# Patient Record
Sex: FEMALE | URBAN - METROPOLITAN AREA
[De-identification: names, ages, dates, MRNs, and addresses within clinical notes are randomized per-mention and may not be internally consistent; named-entity substitution may affect disease eponyms.]

---

## 2017-03-06 NOTE — PATIENT DISCUSSION
The patient notes gradual worsening of visual acuity. This is likely due to progressive atrophy and unfortunately is not treatable.

## 2017-04-12 NOTE — PROCEDURE NOTE: CLINICAL
PROCEDURE NOTE: Eylea (prophy) OD. Diagnosis: Neovascular AMD with Inactive CNV. Prior to injection, risks/benefits/alternatives discussed including corneal abrasion, infection, loss of vision, hemorrhage, cataract, glaucoma, retinal tears or detachment. A written consent is on file, and the need for today’s injection was discussed and the patient is understanding and wishes to proceed. The entire vial of Eylea was drawn up into a syringe. The opened vial, remaining drug, and filtered needle were disposed of in a certified biohazard container. Betadine prep was performed. Topical anesthesia was induced with Alcaine. 4% lidocaine pledge. A lid speculum was used. A short 30g needle on a 1cc syringe was used with product that that had previously been prepared under sterile conditions. Injection site: 3-4 mm from the limbus. The used syringe/needle was transferred to a biohazard container. Lid speculum removed. Mask worn during procedure. Patient tolerated procedure well. Count fingers vision was verified. There were no complications. Patient was given the standard instruction sheet. Patient given office phone number/answering service number and advised to call immediately should there be loss of vision or pain, or should they have any other questions or concerns. Alisa Bronson

## 2017-04-12 NOTE — PATIENT DISCUSSION
Recommended Eylea #9 (prophy) injection. The injection was given and tolerated well by patient. Post-injection instructions were reviewed and understood by the patient.

## 2017-05-24 NOTE — PROCEDURE NOTE: CLINICAL
PROCEDURE NOTE: Eylea (Prophy) OD. Diagnosis: Neovascular AMD with Inactive CNV. Prep: Betadine Flush. Prior to injection, risks/benefits/alternatives discussed including corneal abrasion, infection, loss of vision, hemorrhage, cataract, glaucoma, retinal tears or detachment. A written consent is on file, and the need for today’s injection was discussed and the patient is understanding and wishes to proceed. The entire vial of Eylea was drawn up into a syringe. The opened vial, remaining drug, and filtered needle were disposed of in a certified biohazard container. Betadine prep was performed. Topical anesthesia was induced with Alcaine. 4% lidocaine pledge. A lid speculum was used. A short 30g needle on a 1cc syringe was used with product that that had previously been prepared under sterile conditions. Injection site: 3-4 mm from the limbus. The used syringe/needle was transferred to a biohazard container. Lid speculum removed. Mask worn during procedure. Patient tolerated procedure well. Count fingers vision was verified. There were no complications. Patient was given the standard instruction sheet. Patient given office phone number/answering service number and advised to call immediately should there be loss of vision or pain, or should they have any other questions or concerns. Jj Tejada

## 2017-12-01 NOTE — PROCEDURE NOTE: CLINICAL
PROCEDURE NOTE: Eylea #12 OD. Diagnosis: Neovascular AMD with Inactive CNV. Prep: Betadine Drops and Betadine Scrub. Prior to injection, risks/benefits/alternatives discussed including corneal abrasion, infection, loss of vision, hemorrhage, cataract, glaucoma, retinal tears or detachment. A written consent is on file, and the need for today’s injection was discussed and the patient is understanding and wishes to proceed. The entire vial of Eylea was drawn up into a syringe. The opened vial, remaining drug, and filtered needle were disposed of in a certified biohazard container. Betadine prep was performed. Topical anesthesia was induced with Alcaine. 4% lidocaine pledge. A lid speculum was used. A short 30g needle on a 1cc syringe was used with product that that had previously been prepared under sterile conditions. Injection site: 3-4 mm from the limbus. The used syringe/needle was transferred to a biohazard container. Lid speculum removed. Mask worn during procedure. Patient tolerated procedure well. Count fingers vision was verified. There were no complications. Patient was given the standard instruction sheet. Patient given office phone number/answering service number and advised to call immediately should there be loss of vision or pain, or should they have any other questions or concerns. Lupe Lee

## 2017-12-01 NOTE — PATIENT DISCUSSION
Injection administered without complication.  Post-injection instructions reviewed and understood by patient.

## 2018-02-08 NOTE — PATIENT DISCUSSION
The patient notes gradual worsening of visual acuity. This is likely due to progressive atrophy and unfortunately is not treatable.  No increase in vision potential due to damage from Advanced AMD.

## 2018-02-08 NOTE — PATIENT DISCUSSION
No fluid seen on OCT or exam today.  Still inactive, recommend treatment for maintenance. Rec Maximo # 13 in 3 weeks. Advised to call with any vision changes.

## 2018-02-28 NOTE — PROCEDURE NOTE: CLINICAL
PROCEDURE NOTE: Eylea #13 OD. Diagnosis: Neovascular AMD with Inactive CNV. Prep: Betadine Drops and Betadine Scrub. Prior to injection, risks/benefits/alternatives discussed including corneal abrasion, infection, loss of vision, hemorrhage, cataract, glaucoma, retinal tears or detachment. A written consent is on file, and the need for today’s injection was discussed and the patient is understanding and wishes to proceed. The entire vial of Eylea was drawn up into a syringe. The opened vial, remaining drug, and filtered needle were disposed of in a certified biohazard container. Betadine prep was performed. Topical anesthesia was induced with Alcaine. 4% lidocaine pledge. A lid speculum was used. A short 30g needle on a 1cc syringe was used with product that that had previously been prepared under sterile conditions. Injection site: 3-4 mm from the limbus. The used syringe/needle was transferred to a biohazard container. Lid speculum removed. Mask worn during procedure. Patient tolerated procedure well. Count fingers vision was verified. There were no complications. Patient was given the standard instruction sheet. Patient given office phone number/answering service number and advised to call immediately should there be loss of vision or pain, or should they have any other questions or concerns. Vandana Huggins

## 2018-04-19 NOTE — PROCEDURE NOTE: CLINICAL
PROCEDURE NOTE: Eylea #14 OD. Diagnosis: Neovascular AMD with Inactive CNV. Prep: Betadine Drops and Betadine Scrub. Prior to injection, risks/benefits/alternatives discussed including corneal abrasion, infection, loss of vision, hemorrhage, cataract, glaucoma, retinal tears or detachment. A written consent is on file, and the need for today’s injection was discussed and the patient is understanding and wishes to proceed. The entire vial of Eylea was drawn up into a syringe. The opened vial, remaining drug, and filtered needle were disposed of in a certified biohazard container. Betadine prep was performed. Topical anesthesia was induced with Alcaine. 4% lidocaine pledge. A lid speculum was used. A short 30g needle on a 1cc syringe was used with product that that had previously been prepared under sterile conditions. Injection site: 3-4 mm from the limbus. The used syringe/needle was transferred to a biohazard container. Lid speculum removed. Mask worn during procedure. Patient tolerated procedure well. Count fingers vision was verified. There were no complications. Patient was given the standard instruction sheet. Patient given office phone number/answering service number and advised to call immediately should there be loss of vision or pain, or should they have any other questions or concerns. Radha Sawyer

## 2018-04-19 NOTE — PATIENT DISCUSSION
Brandon Marsh # 14 today. The injection was given and tolerated well by patient. Post-injection instructions were reviewed and understood by the patient.

## 2018-05-31 NOTE — PATIENT DISCUSSION
Still inactive today.  Recommend Eylea #15 in 3 weeks, for maintenance, just before the Pt leaves for [place].

## 2018-06-20 NOTE — PATIENT DISCUSSION
Pt leaving to go [place] for possibly more than 8 weeks. Advised pt that if she is gone longer than 8 weeks she needs to be seen by a Retina Specialist near her. Records given to patient.

## 2018-06-20 NOTE — PATIENT DISCUSSION
Recommended injection of Eylea # 15. The injection was given and tolerated well by patient. Post-injection instructions were reviewed and understood by the patient.

## 2018-06-20 NOTE — PROCEDURE NOTE: CLINICAL
PROCEDURE NOTE: Eylea #15 OD. Diagnosis: Neovascular AMD with Inactive CNV. Prior to injection, risks/benefits/alternatives discussed including corneal abrasion, infection, loss of vision, hemorrhage, cataract, glaucoma, retinal tears or detachment. A written consent is on file, and the need for today’s injection was discussed and the patient is understanding and wishes to proceed. The entire vial of Eylea was drawn up into a syringe. The opened vial, remaining drug, and filtered needle were disposed of in a certified biohazard container. Betadine prep was performed. Topical anesthesia was induced with Alcaine. 4% lidocaine pledge. A lid speculum was used. A short 30g needle on a 1cc syringe was used with product that that had previously been prepared under sterile conditions. Injection site: 3-4 mm from the limbus. The used syringe/needle was transferred to a biohazard container. Lid speculum removed. Mask worn during procedure. Patient tolerated procedure well. Count fingers vision was verified. There were no complications. Patient was given the standard instruction sheet. Patient given office phone number/answering service number and advised to call immediately should there be loss of vision or pain, or should they have any other questions or concerns. Mahogany Navarro

## 2018-11-08 NOTE — PATIENT DISCUSSION
Rec Eylea in 3-4 weeks .  No new fluid or blood seen.  Pt will need to take the DMV form to an O.D. to fill out.

## 2018-11-28 NOTE — PROCEDURE NOTE: CLINICAL
PROCEDURE NOTE: Eylea 2mg #18 OD. Diagnosis: Neovascular AMD with Inactive CNV. Prior to injection, risks/benefits/alternatives discussed including corneal abrasion, infection, loss of vision, hemorrhage, cataract, glaucoma, retinal tears or detachment. A written consent is on file, and the need for today’s injection was discussed and the patient is understanding and wishes to proceed. The entire vial of Eylea was drawn up into a syringe. The opened vial, remaining drug, and filtered needle were disposed of in a certified biohazard container. Betadine prep was performed. Topical anesthesia was induced with Alcaine. 4% lidocaine pledge. A lid speculum was used. A short 30g needle on a 1cc syringe was used with product that that had previously been prepared under sterile conditions. Injection site: 3-4 mm from the limbus. The used syringe/needle was transferred to a biohazard container. Lid speculum removed. Mask worn during procedure. Patient tolerated procedure well. Count fingers vision was verified. There were no complications. Patient was given the standard instruction sheet. Patient given office phone number/answering service number and advised to call immediately should there be loss of vision or pain, or should they have any other questions or concerns. Lupe Lee

## 2019-02-06 NOTE — PROCEDURE NOTE: CLINICAL
PROCEDURE NOTE: Eylea 2mg #19 OD. Diagnosis: Neovascular AMD with Inactive CNV. Prior to injection, risks/benefits/alternatives discussed including corneal abrasion, infection, loss of vision, hemorrhage, cataract, glaucoma, retinal tears or detachment. A written consent is on file, and the need for today’s injection was discussed and the patient is understanding and wishes to proceed. The entire vial of Eylea was drawn up into a syringe. The opened vial, remaining drug, and filtered needle were disposed of in a certified biohazard container. Betadine prep was performed. Topical anesthesia was induced with Alcaine. 4% lidocaine pledge. A lid speculum was used. A short 30g needle on a 1cc syringe was used with product that that had previously been prepared under sterile conditions. Injection site: 3-4 mm from the limbus. The used syringe/needle was transferred to a biohazard container. Lid speculum removed. Mask worn during procedure. Patient tolerated procedure well. Count fingers vision was verified. There were no complications. Patient was given the standard instruction sheet. Patient given office phone number/answering service number and advised to call immediately should there be loss of vision or pain, or should they have any other questions or concerns. A#[id number] exp 07/2019.

## 2019-03-28 NOTE — PATIENT DISCUSSION
Pt edu remaining stable. Due to high tendency to return and only [facility] would recommend Eylea # 20 in 3 weeks. Goal is to treat and extend. Pt elects to proceed. [place] leaving [place] until about June. Pt edu there is always dry AMD under Wet AMD and atrophy could be contributing to decreased VA and since only [facility] will notice subtle changes in [place].

## 2019-04-17 NOTE — PROCEDURE NOTE: CLINICAL
PROCEDURE NOTE: Eylea 2mg #20 OD. Diagnosis: Neovascular AMD with Inactive CNV. Prior to injection, risks/benefits/alternatives discussed including corneal abrasion, infection, loss of vision, hemorrhage, cataract, glaucoma, retinal tears or detachment. A written consent is on file, and the need for today’s injection was discussed and the patient is understanding and wishes to proceed. The entire vial of Eylea was drawn up into a syringe. The opened vial, remaining drug, and filtered needle were disposed of in a certified biohazard container. Betadine prep was performed. Topical anesthesia was induced with Alcaine. 4% lidocaine pledge. A lid speculum was used. A short 30g needle on a 1cc syringe was used with product that that had previously been prepared under sterile conditions. Injection site: 3-4 mm from the limbus. The used syringe/needle was transferred to a biohazard container. Lid speculum removed. Mask worn during procedure. Patient tolerated procedure well. Count fingers vision was verified. There were no complications. Patient was given the standard instruction sheet. Patient given office phone number/answering service number and advised to call immediately should there be loss of vision or pain, or should they have any other questions or concerns. [facility]#[id number] exp 10/2019.

## 2019-04-23 NOTE — PROCEDURE NOTE: CLINICAL
PROCEDURE NOTE: Excision of Eyelid Lesion Left Upper Lid. Diagnosis: Eyelid Lesion, Uncertain Behavior. Anesthesia: Topical. Prior to treatment, the risks/benefits/alternatives were discussed. The patient wished to proceed with procedure. Local anesthetic was given. The eyelid was prepped and draped for procedure. The lesion was excised and cauterized and Sagar lian was applied. Patient tolerated procedure well. There were no complications. Post procedure instructions given. Radha Sawyer

## 2019-04-23 NOTE — PATIENT DISCUSSION
Discussed with patient lesion appears benign can be removed.  May grow back.  Patient desires removal today.   Lesion removed today and discarded.  Follow up with FARRUKH elena.

## 2019-06-13 NOTE — PATIENT DISCUSSION
No fluid seen on OCT or exam today.  Recommended Eylea #21 in 2-3 weeks for maintenance.  Pt states she will be in [place] until mid Sept.  Pt will rodri appt with Dr. Blake Corrigan for 2-3 weeks.

## 2019-06-13 NOTE — PATIENT DISCUSSION
No fluid seen on OCT or exam today.  Recommended Eylea #21 in 2-3 weeks for maintenance.  Pt states she will be in [place], pt will rodri appt with Dr. Tate Guthrie Will fax records to Dr. Willy Garland.

## 2019-10-17 NOTE — PATIENT DISCUSSION
Pt edu stable, no fluid seen on exam or OCT today. Treatment not indicated at this time. Will only treat if worsens. Advised pt to call with any vision changes. Will follow up in 2 months.

## 2019-12-19 NOTE — PATIENT DISCUSSION
Chronic edema present but no tx rec at this time due to advanced damage/discaform scar from Wet AMD.

## 2020-02-27 NOTE — PATIENT DISCUSSION
Pt edu stable, no fluid seen on exam or OCT today. Treatment not indicated at this time. Will only treat if worsens. Advised pt to call with any vision changes. Will follow up in 3 months. Advised pt can only treat wet not dry.

## 2020-04-08 NOTE — PATIENT DISCUSSION
Patient elects observation. Spouse Niraj states patient has been experiencing symptoms of a TIA for over an hour.  Weakness, trouble forming words, slurring. Spouse says she has had this once before  A bad one at that time patient was seen in at [facility] ER.    This nurse advised spouse due to continued symptoms of possible TIA/CVA patient needs to be seen in the ER for evaluation right away,  Spouse states he will take patient to [facility]  As this is where she went in the past.  Niraj instructed  to call 911 if patient unable to get into the car.    Spouse  can be reached at [phone]    Forward to Dr Candace ESTES  Forward to YONI RN [facility] ER follow up

## 2020-06-03 NOTE — PATIENT DISCUSSION
Pt edu stable, no fluid seen on exam or OCT today. Treatment not indicated at this time. Will only treat if worsens. Advised pt to call with any vision changes. Will follow up in 4 months. Advised pt can only treat wet not dry.

## 2021-03-30 ENCOUNTER — IMPORTED ENCOUNTER (OUTPATIENT)
Dept: URBAN - METROPOLITAN AREA CLINIC 9 | Facility: CLINIC | Age: 61
End: 2021-03-30

## 2021-05-27 NOTE — PATIENT DISCUSSION
Pt edu stable, no fluid seen on exam or OCT today. Treatment not indicated at this time. Will only treat if worsens. Advised pt to call with any vision changes. Will follow up in 5 months when pt returns from the [facility]. Advised pt can only treat wet not dry.

## 2021-10-16 ASSESSMENT — VISUAL ACUITY
OS_CC: 20/20 SN
OS_CC: 20/20 - SN
OD_CC: 20/20 SN
OD_CC: 20/30 + SN

## 2021-10-16 ASSESSMENT — TONOMETRY
OD_IOP_MMHG: 16
OS_IOP_MMHG: 16

## 2021-11-11 NOTE — PATIENT DISCUSSION
Stable, no new fluid or heme seen on exam or OCT today. No treatment needed on todays exam. Will restart treatment if worsens. Advised pt to call with any vision changes. Will follow up in 3 months when pt returns from the [facility]. Advised pt can only treat wet not dry.

## 2021-11-11 NOTE — PATIENT DISCUSSION
Chronic edema present but no tx rec at this time due to advanced damage/disciform scar from Wet AMD.

## 2022-03-24 NOTE — PATIENT DISCUSSION
Stable, no new fluid or heme seen on exam or OCT today. No treatment needed on todays exam. Will restart treatment if worsens. Advised pt to call with any vision changes. Will follow up in 6 months when pt returns from the  [facility]. Advised pt can only treat wet not dry.

## 2022-03-28 NOTE — PATIENT DISCUSSION
No retinal detachment or retinal tear noted. 56 yo F with L flank pain, will get ct abd pelv to rule out kidney stones. UA/Ucx + labs to rule out pyelo. Will pain control, give IVF and reassess. 56 yo F with L flank pain, will get ct abd pelv to rule out kidney stones. UA/Ucx + labs to rule out pyelo. Will pain control, give IVF and reassess.    Attending Mercedes: 56 y/o F w/ PMH of DM, HLD presenting w/ L sided flank pain. Seen in [place]. Reports initially felt pain approx 1 month ago but self resolved. Yesterday again felt pain. Described as sharp w/ mild radiation to L side of abd. Constant. No specific exacerbating or alleviating factors. Denies nausea, vomiting, fevers, chills, dysuria, hematuria. No hx of kidney stones. No trauma to the area. Mild improvement of pain w/ tylenol. Pt well appearing on exam, no acute distress. Lungs clear. HR regular. Abd nondistended/soft/nontender. +L CVA tenderness. Non focal neuro exam. Pt w/ L sided flank pain x2 days. Possible kidney stone. Possible pyelo although no fevers. Do not suspect ovarian pathology. Low suspicion for acute surgical abdominal pathology. Plan for labs, urine studies, imaging, meds. Will reassess the need for additional interventions as clinically warranted. 54 yo F with L flank pain, will get ct abd pelv to rule out kidney stones. UA/Ucx + labs to rule out pyelo. Will pain control, give IVF and reassess.    Attending Mercedes: 56 y/o F w/ PMH of DM, HLD presenting w/ L sided flank pain. Seen in [place]. Reports initially felt pain approx 1 month ago but self resolved. Yesterday again felt pain. Described as sharp w/ mild radiation to L side of abd. Constant. No specific exacerbating or alleviating factors. Denies nausea, vomiting, fevers, chills, dysuria, hematuria. No hx of kidney stones. No trauma to the area. Mild improvement of pain w/ tylenol. Pt well appearing on exam, no acute distress. Lungs clear. HR regular. Abd nondistended/soft/nontender. +L CVA tenderness. Non focal neuro exam. Pt w/ L sided flank pain x2 days. Possible kidney stone. Possible pyelo although no fevers. Do not suspect ovarian pathology. Low suspicion for acute surgical abdominal pathology. Do not suspect cardiac etiology. Plan for labs, urine studies, imaging, meds. Will reassess the need for additional interventions as clinically warranted.

## 2022-06-28 NOTE — PATIENT DISCUSSION
Stable, no new fluid or heme seen on exam or OCT today. No treatment needed on todays exam. Will restart treatment if worsens. Advised pt to call with any vision changes. Will follow up in 6 months when pt returns from the  [facility]. Advised pt can only treat wet not dry.

## 2022-07-01 RX ORDER — CLOTRIMAZOLE AND BETAMETHASONE DIPROPIONATE 10; .64 MG/G; MG/G
CREAM TOPICAL
COMMUNITY
Start: 2022-01-07

## 2023-01-01 NOTE — PATIENT DISCUSSION
Recommend Eylea intravitreal Injection. as prophy. [Parents] : parents [Breast milk] : breast milk [Formula ___ oz/feed] : [unfilled] oz of formula per feed [Normal] : Normal [Frequency of stools: ___] : Frequency of stools: [unfilled]  stools [every other day] : every other day. [On back] : sleeps on back [No] : No cigarette smoke exposure [Exposure to electronic nicotine delivery system] : No exposure to electronic nicotine delivery system [Rear facing car seat in back seat] : Rear facing car seat in back seat [FreeTextEntry7] : Patient has been well [de-identified] : Pumping breastmilk and adding 2 ounces of formula every 3 hours

## 2023-08-23 NOTE — PATIENT DISCUSSION
Low bp so cannot start entresto or inpefa  Await echo  Future Appointments   Date Time Provider [address]   8/25/2023  8:00 AM [facility] ECHO 3 [facility] BS AMB   10/26/2023  8:00 AM [facility] EST[facility]AN[facility] 1 [facility]   11/22/2023 11:00 AM Smiley Dennis MD [facility] BS AMB   11/27/2023 11:00 AM [facility] ECHO 2 [facility] BS AMB   11/27/2023 11:40 AM Edgardo Ugalde MD [facility] BS AMB   8/16/2024  9:40 AM [facility], [facility] BS AMB   8/16/2024 10:00 AM PRICILA Shaffer NP [facility] BS AMB No fluid seen on OCT or exam today.  Still inactive, recommend treatment for maintenance.